# Patient Record
Sex: MALE | Race: BLACK OR AFRICAN AMERICAN | NOT HISPANIC OR LATINO | URBAN - METROPOLITAN AREA
[De-identification: names, ages, dates, MRNs, and addresses within clinical notes are randomized per-mention and may not be internally consistent; named-entity substitution may affect disease eponyms.]

---

## 2020-01-22 ENCOUNTER — OFFICE VISIT (OUTPATIENT)
Dept: INTERNAL MEDICINE | Facility: CLINIC | Age: 25
End: 2020-01-22
Payer: COMMERCIAL

## 2020-01-22 VITALS
WEIGHT: 225.88 LBS | TEMPERATURE: 102 F | DIASTOLIC BLOOD PRESSURE: 72 MMHG | HEIGHT: 72 IN | BODY MASS INDEX: 30.6 KG/M2 | SYSTOLIC BLOOD PRESSURE: 120 MMHG

## 2020-01-22 DIAGNOSIS — J10.1 INFLUENZA DUE TO INFLUENZA VIRUS, TYPE A, HUMAN: Primary | ICD-10-CM

## 2020-01-22 LAB
CTP QC/QA: YES
POC MOLECULAR INFLUENZA A AGN: POSITIVE
POC MOLECULAR INFLUENZA B AGN: NEGATIVE

## 2020-01-22 PROCEDURE — 99999 PR PBB SHADOW E&M-NEW PATIENT-LVL II: CPT | Mod: PBBFAC,,, | Performed by: INTERNAL MEDICINE

## 2020-01-22 PROCEDURE — 3008F PR BODY MASS INDEX (BMI) DOCUMENTED: ICD-10-PCS | Mod: CPTII,S$GLB,, | Performed by: INTERNAL MEDICINE

## 2020-01-22 PROCEDURE — 87502 INFLUENZA DNA AMP PROBE: CPT | Mod: QW,S$GLB,, | Performed by: INTERNAL MEDICINE

## 2020-01-22 PROCEDURE — 99999 PR PBB SHADOW E&M-NEW PATIENT-LVL II: ICD-10-PCS | Mod: PBBFAC,,, | Performed by: INTERNAL MEDICINE

## 2020-01-22 PROCEDURE — 99203 PR OFFICE/OUTPT VISIT, NEW, LEVL III, 30-44 MIN: ICD-10-PCS | Mod: S$GLB,,, | Performed by: INTERNAL MEDICINE

## 2020-01-22 PROCEDURE — 3008F BODY MASS INDEX DOCD: CPT | Mod: CPTII,S$GLB,, | Performed by: INTERNAL MEDICINE

## 2020-01-22 PROCEDURE — 99203 OFFICE O/P NEW LOW 30 MIN: CPT | Mod: S$GLB,,, | Performed by: INTERNAL MEDICINE

## 2020-01-22 PROCEDURE — 87502 POCT INFLUENZA A/B MOLECULAR: ICD-10-PCS | Mod: QW,S$GLB,, | Performed by: INTERNAL MEDICINE

## 2020-01-22 RX ORDER — OSELTAMIVIR PHOSPHATE 75 MG/1
75 CAPSULE ORAL 2 TIMES DAILY
Qty: 10 CAPSULE | Refills: 0 | Status: SHIPPED | OUTPATIENT
Start: 2020-01-22 | End: 2020-01-27

## 2020-01-22 NOTE — LETTER
January 22, 2020                 [facility]  Internal Medicine  [address]  [place] [address]  Phone: [phone]  Fax: [phone]   January 22, 2020     Patient: Phillip Barger   YOB: 1995   Date of Visit: 1/22/2020       To Whom it May Concern:    Phillip Barger was seen in my clinic on 1/22/2020. He may return to work on 01/27/2020.    Please excuse him from any classes or work missed.    If you have any questions or concerns, please don't hesitate to call.    Sincerely,       Benson Beckwith MD

## 2020-01-22 NOTE — PROGRESS NOTES
Subjective:      Patient ID: Phillip Barger is a 24 y.o. male.    Chief Complaint: Fever      HPI     Mr. Phillip Barger who presents due to Fever    He reports having a subjective fever yesterday associated with severe fatigue. He also reports soreness in his legs, arms, back and HA. He also notes a dry cough since Monday, which became productive of sputum Tuesday. He is accompanied by his mother, who reports that he had vomited 3 times this morning. He has been drinking ~1 bottle (16.9 oz) water per day. He has taken ibuprofen 800 mg 1 tab last night.      History reviewed. No pertinent past medical history.  History reviewed. No pertinent surgical history.  Social History     Socioeconomic History    Marital status: Single     Spouse name: Not on file    Number of children: Not on file    Years of education: Not on file    Highest education level: Not on file   Occupational History    Not on file   Social Needs    Financial resource strain: Not on file    Food insecurity:     Worry: Not on file     Inability: Not on file    Transportation needs:     Medical: Not on file     Non-medical: Not on file   Tobacco Use    Smoking status: Not on file   Substance and Sexual Activity    Alcohol use: Not on file    Drug use: Not on file    Sexual activity: Not on file   Lifestyle    Physical activity:     Days per week: Not on file     Minutes per session: Not on file    Stress: Not on file   Relationships    Social connections:     Talks on phone: Not on file     Gets together: Not on file     Attends [employer] service: Not on file     Active member of club or organization: Not on file     Attends meetings of clubs or organizations: Not on file     Relationship status: Not on file   Other Topics Concern    Not on file   Social History Narrative    Not on file     History reviewed. No pertinent family history.    Current Outpatient Medications:     oseltamivir (TAMIFLU) 75 MG capsule, Take 1 capsule (75 mg  total) by mouth 2 (two) times daily. for 5 days, Disp: 10 capsule, Rfl: 0    Review of patient's allergies indicates:  No Known Allergies     Review of Systems   All remaining systems negative    Objective:     /72 (BP Location: Left arm, Patient Position: Sitting, BP Method: Large (Manual))   Temp (!) 101.5 °F (38.6 °C) (Temporal)   Ht 6' (1.829 m)   Wt 102.4 kg (225 lb 13.8 oz)   BMI 30.63 kg/m²     Physical Exam  GEN: Lethargic and diaphoretic  PSYC: Flat affect      Lab Results   Component Value Date    WBC 6.53 09/14/2006    HGB 11.6 09/14/2006    HCT 35.5 09/14/2006     09/14/2006    CHOL 167 01/21/2006    TRIG 74 (H) 01/21/2006    HDL 46.0 (L) 01/21/2006    LDLCALC 106.2 (H) 01/21/2006     08/24/2006    K 4.4 08/24/2006    CL 99 08/24/2006    CREATININE 0.5 08/24/2006    BUN 10 08/24/2006    CO2 24 08/24/2006    CALCIUM 9.0 08/24/2006    INR 1.0 09/14/2006    GLUF 91 04/22/2006       Assessment:      1. Influenza due to influenza virus, type A, human        Plan:   Influenza due to influenza virus, type A, human  -     oseltamivir (TAMIFLU) 75 MG capsule; Take 1 capsule (75 mg total) by mouth 2 (two) times daily. for 5 days  Dispense: 10 capsule; Refill: 0  -     POCT Influenza A/B Molecular        Follow up if symptoms worsen or fail to improve.

## 2020-01-28 ENCOUNTER — OFFICE VISIT (OUTPATIENT)
Dept: INTERNAL MEDICINE | Facility: CLINIC | Age: 25
End: 2020-01-28
Payer: COMMERCIAL

## 2020-01-28 VITALS
SYSTOLIC BLOOD PRESSURE: 116 MMHG | BODY MASS INDEX: 30.03 KG/M2 | OXYGEN SATURATION: 98 % | TEMPERATURE: 98 F | HEART RATE: 69 BPM | DIASTOLIC BLOOD PRESSURE: 72 MMHG | HEIGHT: 72 IN | WEIGHT: 221.69 LBS

## 2020-01-28 DIAGNOSIS — E66.9 OBESITY (BMI 30.0-34.9): ICD-10-CM

## 2020-01-28 DIAGNOSIS — R35.0 URINARY FREQUENCY: ICD-10-CM

## 2020-01-28 DIAGNOSIS — Z76.89 ENCOUNTER TO ESTABLISH CARE: Primary | ICD-10-CM

## 2020-01-28 DIAGNOSIS — E55.9 VITAMIN D DEFICIENCY: ICD-10-CM

## 2020-01-28 LAB
BACTERIA #/AREA URNS AUTO: ABNORMAL /HPF
BILIRUB SERPL-MCNC: NEGATIVE MG/DL
BLOOD, POC UA: NEGATIVE
GLUCOSE UR QL STRIP: NORMAL
KETONES UR QL STRIP: NEGATIVE
LEUKOCYTE ESTERASE URINE, POC: NORMAL
MICROSCOPIC COMMENT: ABNORMAL
NITRITE, POC UA: NEGATIVE
PH, POC UA: 5
PROTEIN, POC: NORMAL
SPECIFIC GRAVITY, POC UA: 1.02
UROBILINOGEN, POC UA: NORMAL
WBC #/AREA URNS AUTO: 4 /HPF (ref 0–5)

## 2020-01-28 PROCEDURE — 3008F BODY MASS INDEX DOCD: CPT | Mod: CPTII,S$GLB,, | Performed by: INTERNAL MEDICINE

## 2020-01-28 PROCEDURE — 81001 URINALYSIS AUTO W/SCOPE: CPT

## 2020-01-28 PROCEDURE — 99214 PR OFFICE/OUTPT VISIT, EST, LEVL IV, 30-39 MIN: ICD-10-PCS | Mod: 25,S$GLB,, | Performed by: INTERNAL MEDICINE

## 2020-01-28 PROCEDURE — 3008F PR BODY MASS INDEX (BMI) DOCUMENTED: ICD-10-PCS | Mod: CPTII,S$GLB,, | Performed by: INTERNAL MEDICINE

## 2020-01-28 PROCEDURE — 99999 PR PBB SHADOW E&M-EST. PATIENT-LVL III: ICD-10-PCS | Mod: PBBFAC,,, | Performed by: INTERNAL MEDICINE

## 2020-01-28 PROCEDURE — 81003 POCT URINALYSIS: ICD-10-PCS | Mod: QW,S$GLB,, | Performed by: INTERNAL MEDICINE

## 2020-01-28 PROCEDURE — 81003 URINALYSIS AUTO W/O SCOPE: CPT | Mod: QW,S$GLB,, | Performed by: INTERNAL MEDICINE

## 2020-01-28 PROCEDURE — 99999 PR PBB SHADOW E&M-EST. PATIENT-LVL III: CPT | Mod: PBBFAC,,, | Performed by: INTERNAL MEDICINE

## 2020-01-28 PROCEDURE — 99214 OFFICE O/P EST MOD 30 MIN: CPT | Mod: 25,S$GLB,, | Performed by: INTERNAL MEDICINE

## 2020-01-28 PROCEDURE — 87086 URINE CULTURE/COLONY COUNT: CPT

## 2020-01-28 NOTE — PROGRESS NOTES
Subjective:      Patient ID: Phillip Barger is a 24 y.o. male.    Chief Complaint: Follow-up      HPI     Mr. Phillip Barger is a patient of Benson Beckwith MD, who presents for Follow-up    He reports feeling well. He reports receiving IVFs at [facility] when dx'd with FLU. However, he was told he had WBCs in his urine, which he could f/u here about.     VS, labs & imaging reviewed and discussed with patient, including no labs since 2006.    Of note, EPIC indicates due preventive measures, including HIV, TDAP, FLU, all of which he declines at this time.       Past Medical History:   Diagnosis Date    Obesity (BMI 30.0-34.9)      History reviewed. No pertinent surgical history.  Social History     Socioeconomic History    Marital status: Single     Spouse name: Not on file    Number of children: Not on file    Years of education: Not on file    Highest education level: Not on file   Occupational History    Not on file   Social Needs    Financial resource strain: Not on file    Food insecurity:     Worry: Not on file     Inability: Not on file    Transportation needs:     Medical: Not on file     Non-medical: Not on file   Tobacco Use    Smoking status: Never Smoker    Smokeless tobacco: Never Used   Substance and Sexual Activity    Alcohol use: Yes     Alcohol/week: 0.0 - 2.0 standard drinks     Drinks per session: 1 or 2     Comment: 1-2 beers every 3-4 months    Drug use: Never    Sexual activity: Not Currently   Lifestyle    Physical activity:     Days per week: Not on file     Minutes per session: Not on file    Stress: Not on file   Relationships    Social connections:     Talks on phone: Not on file     Gets together: Not on file     Attends [employer] service: Not on file     Active member of club or organization: Not on file     Attends meetings of clubs or organizations: Not on file     Relationship status: Not on file   Other Topics Concern    Not on file   Social History Narrative    Not on  file     Family History   Problem Relation Age of Onset    Colon cancer Father 42        Mets to liver; passed at 50 years     No current outpatient medications on file.    Review of patient's allergies indicates:  No Known Allergies     Review of Systems   All remaining systems negative    Objective:     /72 (BP Location: Left arm, Patient Position: Sitting, BP Method: Large (Manual))   Pulse 69   Temp 98.2 °F (36.8 °C) (Temporal)   Ht 6' (1.829 m)   Wt 100.5 kg (221 lb 10.8 oz)   SpO2 98%   BMI 30.06 kg/m²     Physical Exam  GEN: A&O fully, NAD  PSYC: Normal affect      Lab Results   Component Value Date    WBC 6.53 09/14/2006    HGB 11.6 09/14/2006    HCT 35.5 09/14/2006     09/14/2006    CHOL 167 01/21/2006    TRIG 74 (H) 01/21/2006    HDL 46.0 (L) 01/21/2006    LDLCALC 106.2 (H) 01/21/2006     08/24/2006    K 4.4 08/24/2006    CL 99 08/24/2006    CREATININE 0.5 08/24/2006    BUN 10 08/24/2006    CO2 24 08/24/2006    CALCIUM 9.0 08/24/2006    INR 1.0 09/14/2006    GLUF 91 04/22/2006       Assessment:      1. Encounter to establish care    2. Urinary frequency: Will check u/a and Ucx.    3. Vitamin D deficiency: Will check.    4.      Obesity: I encouraged the following permanent lifestyle modifications, particularly gradually and            systematically increasing physical activity (5-90 min/episode, 3-5 times/week), water (1/2 of body            weight in ounces) and avoiding potatoes, sugar sweetened beverages, red/processed meats            (including chicken), fast food, grains (rice/bread/pasta); and increasing yogurt, whole fruits, unsalted            nuts to 3-5 servings/day, and daily weight logging; non-starchy vegetables, cooked beans, and            un-fried seafood have weak effects on weight.    Plan:   Encounter to establish care  -     CBC auto differential; Future; Expected date: 01/28/2020  -     Comprehensive metabolic panel; Future; Expected date: 01/28/2020  -      Lipid panel; Future; Expected date: 01/28/2020    Urinary frequency  -     POCT Urinalysis  -     Urinalysis Microscopic  -     Urine culture    Vitamin D deficiency  -     Vitamin D; Future; Expected date: 01/28/2020    Obesity (BMI 30.0-34.9)      Follow up in about 5 months (around 6/28/2020), or if symptoms worsen or fail to improve, for Annual.    I spent >25 minutes of time with patient 50% or more of which was discussing labs and plans of care.

## 2020-01-29 DIAGNOSIS — N30.01 ACUTE CYSTITIS WITH HEMATURIA: Primary | ICD-10-CM

## 2020-01-29 RX ORDER — SULFAMETHOXAZOLE AND TRIMETHOPRIM 800; 160 MG/1; MG/1
1 TABLET ORAL 2 TIMES DAILY
Qty: 6 TABLET | Refills: 0 | Status: SHIPPED | OUTPATIENT
Start: 2020-01-29 | End: 2020-02-01

## 2020-01-30 ENCOUNTER — TELEPHONE (OUTPATIENT)
Dept: INTERNAL MEDICINE | Facility: CLINIC | Age: 25
End: 2020-01-30

## 2020-01-30 LAB
BACTERIA UR CULT: NORMAL
BACTERIA UR CULT: NORMAL

## 2020-01-30 NOTE — TELEPHONE ENCOUNTER
----- Message from Mary Ross sent at 1/30/2020  4:02 PM CST -----  Contact: Mother MS McconnellsKSFSO-[phone]  Would like to consult with the nurse, Patient is Returning the nurse call, Please call back  At [phone], Thanks sj  .Type:  Patient Returning Call    Who Called:   Who Left Message for Patient:  Does the patient know what this is regarding?:no  Would the patient rather a call back or a response via [facility]? callback  Best Call Back Number:[phone]  Additional Information:

## 2020-08-11 ENCOUNTER — TELEPHONE (OUTPATIENT)
Dept: INTERNAL MEDICINE | Facility: CLINIC | Age: 25
End: 2020-08-11

## 2020-09-01 ENCOUNTER — TELEPHONE (OUTPATIENT)
Dept: INTERNAL MEDICINE | Facility: CLINIC | Age: 25
End: 2020-09-01